# Patient Record
Sex: FEMALE | Race: WHITE
[De-identification: names, ages, dates, MRNs, and addresses within clinical notes are randomized per-mention and may not be internally consistent; named-entity substitution may affect disease eponyms.]

---

## 2017-05-31 ENCOUNTER — HOSPITAL ENCOUNTER (EMERGENCY)
Dept: HOSPITAL 80 - FED | Age: 67
Discharge: HOME | End: 2017-05-31
Payer: COMMERCIAL

## 2017-05-31 VITALS — SYSTOLIC BLOOD PRESSURE: 119 MMHG | OXYGEN SATURATION: 96 % | DIASTOLIC BLOOD PRESSURE: 60 MMHG | HEART RATE: 77 BPM

## 2017-05-31 VITALS — TEMPERATURE: 98.1 F | RESPIRATION RATE: 16 BRPM

## 2017-05-31 DIAGNOSIS — R06.02: Primary | ICD-10-CM

## 2017-05-31 LAB
% IMMATURE GRANULYOCYTES: 0.3 % (ref 0–1.1)
ABSOLUTE IMMATURE GRANULOCYTES: 0.02 10^3/UL (ref 0–0.1)
ABSOLUTE NRBC COUNT: 0 10^3/UL (ref 0–0.01)
ADD DIFF?: NO
ADD MORPH?: NO
ADD SCAN?: NO
ANION GAP SERPL CALC-SCNC: 13 MEQ/L (ref 8–16)
ATYPICAL LYMPHOCYTE FLAG: 10 (ref 0–99)
CALCIUM SERPL-MCNC: 9 MG/DL (ref 8.5–10.4)
CHLORIDE SERPL-SCNC: 110 MEQ/L (ref 97–110)
CO2 SERPL-SCNC: 23 MEQ/L (ref 22–31)
CREAT SERPL-MCNC: 0.8 MG/DL (ref 0.6–1)
ERYTHROCYTE [DISTWIDTH] IN BLOOD BY AUTOMATED COUNT: 12.8 % (ref 11.5–15.2)
FRAGMENT RBC FLAG: 0 (ref 0–99)
GFR SERPL CREATININE-BSD FRML MDRD: > 60 ML/MIN/{1.73_M2}
GLUCOSE SERPL-MCNC: 94 MG/DL (ref 70–100)
HCT VFR BLD CALC: 39.3 % (ref 38–47)
HGB BLD-MCNC: 13.2 G/DL (ref 12.6–16.3)
LEFT SHIFT FLG: 0 (ref 0–99)
LIPEMIA HEMOLYSIS FLAG: 80 (ref 0–99)
MCH RBC BLDCO QN: 32.1 PG (ref 27.9–34.1)
MCHC RBC AUTO-ENTMCNC: 33.6 G/DL (ref 32.4–36.7)
MCV RBC AUTO: 95.6 FL (ref 81.5–99.8)
NRBC-AUTO%: 0 % (ref 0–0.2)
PLATELET # BLD: 180 10^3/UL (ref 150–400)
PLATELET CLUMPS FLAG: 0 (ref 0–99)
PMV BLD AUTO: 11.4 FL (ref 8.7–11.7)
POTASSIUM SERPL-SCNC: 4.3 MEQ/L (ref 3.5–5.2)
RBC # BLD AUTO: 4.11 10^6/UL (ref 4.18–5.33)
SODIUM SERPL-SCNC: 146 MEQ/L (ref 134–144)
TROPONIN I SERPL-MCNC: < 0.012 NG/ML (ref 0–0.03)

## 2017-05-31 NOTE — CPEKG
Heart Rate: 70

RR Interval: 857

P-R Interval: 164

QRSD Interval: 92

QT Interval: 440

QTC Interval: 475

P Axis: 59

QRS Axis: 64

T Wave Axis: 59

EKG Severity - NORMAL ECG -

EKG Impression: SINUS RHYTHM

Electronically Signed By: José Miguel Nicole 31-May-2017 20:16:29

## 2017-05-31 NOTE — EDPHY
H & P


Smoking Status: Never smoked


Time Seen by Provider: 05/31/17 15:59


HPI/ROS: 





CHIEF COMPLAINT:  Near syncope, shortness of breath





HISTORY OF PRESENT ILLNESS:  58-year-old female presents to the emergency 

department by private vehicle complaining of near syncope intermittently over 

last few weeks.  The patient states that very difficult to describe.  She does 

not have pain in her chest.  She does feel like she is having shortness of 

breath however she was feeling like she could get a full breath of air.  She 

has no pain associated with breathing.  No recent travel.  No calf pain or 

swelling.  No abdominal pain.  She did have a headache last evening that lasted 

most of the night however this is now resolved.  She has no headache now.  She 

does not feel dizzy.  Denies neck or back pain.  Denies abdominal pain. No 

nausea or vomiting. The patient states that she did have 1 of these episodes 

where she was not feeling "quite right" while she was in the grocery store 

today.  She did have 1 episode where woke up out of her sleep about 3 or 4 

weeks ago.





REVIEW OF SYSTEMS:


Constitutional:  No fever, no chills.


Eyes:  No double or blurry vision.


ENT:  No sore throat.


Respiratory:  Short of breath. No cough.


Cardiac:  No chest pain.


Gastrointestinal:  No abdominal pain, vomiting or diarrhea.


Genitourinary:  No dysuria.


Musculoskeletal:  No neck or back pain.


Skin:  No rashes.


Neurological:  No headache. (Cynthia Azul)


Past Medical/Surgical History: 





Hypothyroidism, cholecystectomy (Latha,Cynthia M)


Social History: 





Single lives alone in [place] (Ada Azula M)


Physical Exam: 





General Appearance:  Alert, no distress. Vital signs are stable. She is tearful.


Eyes:  Pupils equal and round.  Extraocular motions are all intact.


ENT:  Mouth:  Mucous membranes moist.


Respiratory:  No wheezing, rhonchi, or rales, lungs are clear to auscultation.


Cardiovascular:  Regular rate and rhythm.


Gastrointestinal:  Abdomen is soft and nontender, no masses, no rebound or 

guarding, bowel sounds normal.


Neurological:  Alert and oriented x 3, cranial nerves II through XII grossly 

intact


Skin:  Warm and dry, no rashes.


Musculoskeletal:  Nontender to palpate along the cervical, thoracic or lumbar 

spine.  Neck is supple.


Extremities:  Full range of motion and no peripheral edema.


Psychiatric:  Patient is oriented X 3, there is no agitation. (Cynthia Azul)


Constitutional: 


 Initial Vital Signs











Temperature (C)  36.7 C   05/31/17 15:38


 


Heart Rate  69   05/31/17 15:38


 


Respiratory Rate  16   05/31/17 15:38


 


Blood Pressure  121/62 H  05/31/17 15:38


 


O2 Sat (%)  97   05/31/17 15:38











Allergies/Adverse Reactions: 


 





Sulfa (Sulfonamide Antibiotics) Allergy (Severe, Verified 08/26/16 16:55)


 Generalized swelling








Home Medications: 














 Medication  Instructions  Recorded


 


Ciprofloxacin [Cipro] 500 mg PO BID #14 tab 08/26/16


 


metroNIDAZOLE [Flagyl 500 mg (*)] 500 mg PO QID #28 tab 08/26/16














Medical Decision Making





- Diagnostics


EKG Interpretation: 





EKG:  Complete interpretation has been separately recorded in the [employer] 

archive.  Summary impression:  Sinus rhythm, rate 70 (Cristian Muir)


Imaging Results: 


 Imaging Impressions





Chest X-Ray  05/31/17 16:16


Impression: 


1. No acute process.


2. Minimal bronchitis/airways disease.











ED Course/Re-evaluation: 





66-year-old female presents to the emergency department feeling short of breath 

and having possibly near syncopal episode.  Patient states intermittently over 

last few weeks she has felt difficulty breathing.  She does not know if this is 

related to anxiety. She called her primary care provider today and told to come 

to the emergency department for evaluation.  Patient states that she currently 

does not feel short of breath.  She has no chest pain.  She is not tachycardic.





Laboratory studies including D-dimer and troponin were negative.  Chest x-ray 

was unremarkable.  EKG reveals normal sinus rhythm.





I do not think this patient has a pulmonary embolism.  She is not tachycardic.  

She is not short of breath currently.  She describes no pleuritic chest pain.





The case was discussed with Dr. Frankie Muir, secondary supervising physician, 

who did not directly evaluate the patient but agrees with treatment and plan.





Patient was encouraged to follow up with Cardiology to arrange for a Holter 

monitor and possible treadmill test.  She was given follow-up information.  The 

patient verbalized understanding and agreed.  She was comfortable being 

discharged home. (Cynthia Azul)


Differential Diagnosis: 





Shortness of breath including but not limited to anxiety, pulmonary infectious 

process, COPD, asthma, pulmonary embolus and congestive heart failure. (Cynthia Azul)





- Data Points


Laboratory Results: 


 Laboratory Results





 05/31/17 16:06 





 05/31/17 16:06 





 











  05/31/17 05/31/17 05/31/17





  16:06 16:06 16:06


 


WBC      7.52 10^3/uL 10^3/uL





     (3.80-9.50) 


 


RBC      4.11 10^6/uL L 10^6/uL





     (4.18-5.33) 


 


Hgb      13.2 g/dL g/dL





     (12.6-16.3) 


 


Hct      39.3 % %





     (38.0-47.0) 


 


MCV      95.6 fL fL





     (81.5-99.8) 


 


MCH      32.1 pg pg





     (27.9-34.1) 


 


MCHC      33.6 g/dL g/dL





     (32.4-36.7) 


 


RDW      12.8 % %





     (11.5-15.2) 


 


Plt Count      180 10^3/uL 10^3/uL





     (150-400) 


 


MPV      11.4 fL fL





     (8.7-11.7) 


 


Neut % (Auto)      56.4 % %





     (39.3-74.2) 


 


Lymph % (Auto)      33.1 % %





     (15.0-45.0) 


 


Mono % (Auto)      8.1 % %





     (4.5-13.0) 


 


Eos % (Auto)      1.2 % %





     (0.6-7.6) 


 


Baso % (Auto)      0.9 % %





     (0.3-1.7) 


 


Nucleat RBC Rel Count      0.0 % %





     (0.0-0.2) 


 


Absolute Neuts (auto)      4.24 10^3/uL 10^3/uL





     (1.70-6.50) 


 


Absolute Lymphs (auto)      2.49 10^3/uL 10^3/uL





     (1.00-3.00) 


 


Absolute Monos (auto)      0.61 10^3/uL 10^3/uL





     (0.30-0.80) 


 


Absolute Eos (auto)      0.09 10^3/uL 10^3/uL





     (0.03-0.40) 


 


Absolute Basos (auto)      0.07 10^3/uL 10^3/uL





     (0.02-0.10) 


 


Absolute Nucleated RBC      0.00 10^3/uL 10^3/uL





     (0-0.01) 


 


Immature Gran %      0.3 % %





     (0.0-1.1) 


 


Immature Gran #      0.02 10^3/uL 10^3/uL





     (0.00-0.10) 


 


D-Dimer    < 0.27 ug/mLFEU ug/mLFEU  





    (0.00-0.50)  


 


Sodium  146 mEq/L H mEq/L    





   (134-144)   


 


Potassium  4.3 mEq/L mEq/L    





   (3.5-5.2)   


 


Chloride  110 mEq/L mEq/L    





   ()   


 


Carbon Dioxide  23 mEq/l mEq/l    





   (22-31)   


 


Anion Gap  13 mEq/L mEq/L    





   (8-16)   


 


BUN  20 mg/dL mg/dL    





   (7-23)   


 


Creatinine  0.8 mg/dL mg/dL    





   (0.6-1.0)   


 


Estimated GFR  > 60     





    


 


Glucose  94 mg/dL mg/dL    





   ()   


 


Calcium  9.0 mg/dL mg/dL    





   (8.5-10.4)   


 


Troponin I  < 0.012 ng/mL ng/mL    





   (0-0.034)   














Departure





- Departure


Disposition: Home, Routine, Self-Care


Clinical Impression: 


 Shortness of breath





Condition: Good


Instructions:  Dyspnea (ED)


Additional Instructions: 


Follow-up with cardiologist as discussed to arrange for a Holter monitor and 

follow-up appointment.  Please return to the emergency department if you 

developed pain in your chest, difficulty breathing, or if you feel worse in any 

way.


Referrals: 


Shelbie Clinton MD [Primary Care Provider] - 1-2 days without fail


Jose Juan Milan MD [Medical Doctor] - 1-2 days without fail (Cardiologist 

on-call)

## 2017-07-13 ENCOUNTER — HOSPITAL ENCOUNTER (OUTPATIENT)
Dept: HOSPITAL 80 - FIMAGING | Age: 67
End: 2017-07-13
Attending: FAMILY MEDICINE
Payer: COMMERCIAL

## 2017-07-13 DIAGNOSIS — Z12.31: Primary | ICD-10-CM

## 2017-07-13 PROCEDURE — G0202 SCR MAMMO BI INCL CAD: HCPCS

## 2018-04-29 ENCOUNTER — HOSPITAL ENCOUNTER (EMERGENCY)
Dept: HOSPITAL 80 - FED | Age: 68
Discharge: HOME | End: 2018-04-29
Payer: COMMERCIAL

## 2018-04-29 VITALS — DIASTOLIC BLOOD PRESSURE: 63 MMHG | SYSTOLIC BLOOD PRESSURE: 132 MMHG

## 2018-04-29 DIAGNOSIS — S86.911A: Primary | ICD-10-CM
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
## 2018-04-29 DIAGNOSIS — Z88.2: ICD-10-CM

## 2018-04-29 DIAGNOSIS — S00.81XA: ICD-10-CM

## 2018-04-29 DIAGNOSIS — S00.502A: ICD-10-CM

## 2018-04-29 NOTE — EDPHY
H & P


Time Seen by Provider: 04/29/18 10:14


HPI/ROS: 





CHIEF COMPLAINT:  Mechanical fall, right leg pain, facial abrasions, knee 

abrasions, hand abrasions





HISTORY OF PRESENT ILLNESS:  The patient presents to the ED after she sustained 

a mechanical fall.  She fell onto her face, knees and hands.  She presents to 

the ED with complaints of some mild dental pain, right leg pain and superficial 

abrasions.  The patient denies any headache or loss of consciousness.  She 

denies headache, neck pain, chest pain, difficulty breathing, numbness, 

weakness or other complaints.





REVIEW OF SYSTEMS:


A comprehensive 10 point review of systems is otherwise negative aside from 

elements mentioned in the history of present illness.


Source: Patient


Exam Limitations: No limitations





- Medical/Surgical History


Hx Asthma: No


Hx Chronic Respiratory Disease: No


Hx Diabetes: No


Hx Cardiac Disease: No


Hx Renal Disease: No


Hx Cirrhosis: No


Hx Alcoholism: No


Hx HIV/AIDS: No


Hx Splenectomy or Spleen Trauma: No


Other PMH: gall bladder.  hypothyroid.  shingles





- Social History


Smoking Status: Never smoked





- Physical Exam


Exam: 





General Appearance:  Alert, no distress


Head:  Superficial abrasions noted to the nose and upper lip.


Eyes:  Pupils equal, round, reactive


ENT, Mouth:  Patient complaints of pain in her upper central incisors without 

obvious fracture or intraoral laceration


Neck:  Nontender, trachea midline


Respiratory:  No chest wall tender,no subcutaneous air, lungs clear bilaterally


Cardiovascular:  Regular rate and rhythm


Abdomen:  Abdomen is soft and nontender, pelvis stable


Skin:  Superficial abrasions bilateral hands and knees


Back:  No midline T/L/S pain


Extremities:  Tenderness to palpation along the lateral aspect of the right leg


Neurological:  A&Ox3, normal motor function, normal sensory exam, GCS 15


Constitutional: 


 Initial Vital Signs











Temperature (C)  37 C   04/29/18 10:08


 


Heart Rate  76   04/29/18 10:08


 


Respiratory Rate  16   04/29/18 10:08


 


Blood Pressure  150/75 H  04/29/18 10:08


 


O2 Sat (%)  96   04/29/18 10:08








 











O2 Delivery Mode               Room Air














Allergies/Adverse Reactions: 


 





Sulfa (Sulfonamide Antibiotics) Allergy (Severe, Verified 08/26/16 16:55)


 Generalized swelling








Home Medications: 














 Medication  Instructions  Recorded


 


Natural Thyroid  04/29/18














Medical Decision Making





- Diagnostics


Imaging Results: 


 Imaging Impressions





Tibia/Fibula X-Ray  04/29/18 10:25


Impression: Normal right tibia and fibula series.











ED Course/Re-evaluation: 





The patient presents to the ED after mechanical fall.  She is noted to have 

several superficial abrasions.  The patient complaints of dental pain and has 

no obvious clinical fracture on exam.  Patient also complained of right lower 

leg pain.





X-rays of the lower extremity demonstrate no evidence of an acute fracture.  

The patient's abrasions have been cleaned and treated with bacitracin.





The patient will be advised to take Tylenol and ibuprofen as needed for pain.  

She should follow up with her regular dentist for evaluation of her dental 

pain.  She is advised to follow up with our on-call orthopedic surgeon for any 

persistent leg pain or other concerns.





The patient has no clinical evidence of a closed head injury or cervical spine 

fracture.  She did have serial examinations in the ED and remains with a soft 

nontender abdomen and no additional traumatic injury noted.








Differential Diagnosis: 





Differential diagnosis considered includes fracture, sprain, dislocation





Departure





- Departure


Disposition: Home, Routine, Self-Care


Clinical Impression: 


 Dental injury





Facial abrasion


Qualifiers:


 Encounter type: initial encounter Qualified Code(s): S00.81XA - Abrasion of 

other part of head, initial encounter





Muscle strain of lower leg


Qualifiers:


 Encounter type: initial encounter Laterality: right Qualified Code(s): 

S86.911A - Strain of unspecified muscle(s) and tendon(s) at lower leg level, 

right leg, initial encounter





Condition: Good


Instructions:  Abrasion (ED)


Additional Instructions: 


1. Tylenol and ibuprofen as needed for pain.


2. Apply antibiotic ointment twice a day for next week.


3. Return to the ED for markedly worsening pain or other concerns.


4. Your x-ray demonstrates no evidence of an obvious fracture.  I believe your 

experiencing a strain involving the right leg.  Please follow up with the 

orthopedic surgeon you have been referred to for any unimproved symptoms past 5-

7 days.








Referrals: 


Vishal Velazco MD [Medical Doctor] - As per Instructions

## 2018-06-25 ENCOUNTER — HOSPITAL ENCOUNTER (EMERGENCY)
Dept: HOSPITAL 80 - FED | Age: 68
Discharge: HOME | End: 2018-06-25
Payer: COMMERCIAL

## 2018-06-25 VITALS — DIASTOLIC BLOOD PRESSURE: 62 MMHG | SYSTOLIC BLOOD PRESSURE: 119 MMHG

## 2018-06-25 DIAGNOSIS — I49.3: Primary | ICD-10-CM

## 2018-06-25 LAB — PLATELET # BLD: 204 10^3/UL (ref 150–400)

## 2018-06-25 NOTE — EDPHY
H & P


Stated Complaint: PALPITATIONS/DIZZY/SOB


Time Seen by Provider: 06/25/18 12:06


HPI/ROS: 





CHIEF COMPLAINT:  Palpitations





HISTORY OF PRESENT ILLNESS:  The patient presents the ED with a 2 week history 

of intermittent palpitations.  The patient describes a sensation of a episodic 

brief skipped heartbeat which precipitates symptoms of lightheadedness and 

brief breathlessness.  The patient denies any chest pain per se.  She has no 

history of exertional chest pain or shortness of breath.  The patient has no 

history of coronary artery disease.  She does have a history of hypothyroidism 

which is currently managed with medications.  The patient has been having some 

symptoms of intermittent diarrhea which has not acutely changed.  





REVIEW OF SYSTEMS:


A comprehensive 10 point review of systems is otherwise negative aside from 

elements mentioned in the history of present illness.








Source: Patient





- Personal History


Current Tetanus/Diphtheria Vaccine: Yes


Current Tetanus Diphtheria and Acellular Pertussis (TDAP): Unsure





- Medical/Surgical History


Hx Asthma: No


Hx Chronic Respiratory Disease: No


Hx Diabetes: No


Hx Cardiac Disease: No


Hx Renal Disease: No


Hx Cirrhosis: No


Hx Alcoholism: No


Hx HIV/AIDS: No


Hx Splenectomy or Spleen Trauma: No


Other PMH: gall bladder.  hypothyroid.  shingles





- Social History


Smoking Status: Never smoked





- Physical Exam


Exam: 





General Appearance:  Alert, tearful, anxious


Eyes:  Pupils equal and round no pallor or injection


ENT, Mouth:  Mucous membranes moist


Respiratory:  There are no retractions, lungs are clear to auscultation


Cardiovascular:  Regular rate and rhythm


Gastrointestinal:  Abdomen is soft and nontender, no masses, bowel sounds normal


Neurological:  5/5 strength all 4 extremities


Skin:  Warm and dry, no rashes


Musculoskeletal:  Neck is supple nontender


Extremities:  symmetrical, full range of motion





Constitutional: 


 Initial Vital Signs











Temperature (C)  36.7 C   06/25/18 11:59


 


Heart Rate  69   06/25/18 11:59


 


Respiratory Rate  18   06/25/18 11:59


 


Blood Pressure  109/84 H  06/25/18 11:59


 


O2 Sat (%)  97   06/25/18 11:59








 











O2 Delivery Mode               Room Air














Allergies/Adverse Reactions: 


 





Sulfa (Sulfonamide Antibiotics) Allergy (Severe, Verified 06/25/18 11:58)


 Generalized swelling








Home Medications: 














 Medication  Instructions  Recorded


 


Natural Thyroid  04/29/18














Medical Decision Making





- Diagnostics


EKG Interpretation: 





EKG:  Complete interpretation has been separately recorded in the [employer] 

archive.  Summary impression:  Sinus rhythm, unifocal PVC, no ST segment 

elevation or depression.


ED Course/Re-evaluation: 





The patient was placed on a cardiac monitor.  I spent some time with the 

patient in her room when it was clear she was experiencing symptomatic PVCs.  

She had a direct correlation between PVCs on the monitor and her sensation of 

lightheadedness and breathlessness.  The patient had no significant runs of 

ventricular tachycardia or bigeminy/trigeminy.





Labs testing in the ED is unremarkable.





The patient will be referred to our cardiologist and also follow-up with her 

PCP.





She is discharged to home with customary discharge instructions.








Differential Diagnosis: 





Differential diagnosis considered includes includes SVT, atrial fibrillation, 

hyperthyroidism, anemia, metabolic abnormality











- Data Points


Laboratory Results: 


 Laboratory Results





 06/25/18 12:15 





 06/25/18 12:15 





 











  06/25/18 06/25/18 06/25/18





  12:30 12:15 12:15


 


WBC      9.52 10^3/uL H 10^3/uL





     (3.80-9.50) 


 


RBC      4.33 10^6/uL 10^6/uL





     (4.18-5.33) 


 


Hgb      13.6 g/dL g/dL





     (12.6-16.3) 


 


Hct      41.4 % %





     (38.0-47.0) 


 


MCV      95.6 fL fL





     (81.5-99.8) 


 


MCH      31.4 pg pg





     (27.9-34.1) 


 


MCHC      32.9 g/dL g/dL





     (32.4-36.7) 


 


RDW      12.7 % %





     (11.5-15.2) 


 


Plt Count      204 10^3/uL 10^3/uL





     (150-400) 


 


MPV      11.4 fL fL





     (8.7-11.7) 


 


Neut % (Auto)      62.6 % %





     (39.3-74.2) 


 


Lymph % (Auto)      26.2 % %





     (15.0-45.0) 


 


Mono % (Auto)      9.2 % %





     (4.5-13.0) 


 


Eos % (Auto)      1.1 % %





     (0.6-7.6) 


 


Baso % (Auto)      0.6 % %





     (0.3-1.7) 


 


Nucleat RBC Rel Count      0.0 % %





     (0.0-0.2) 


 


Absolute Neuts (auto)      5.96 10^3/uL 10^3/uL





     (1.70-6.50) 


 


Absolute Lymphs (auto)      2.49 10^3/uL 10^3/uL





     (1.00-3.00) 


 


Absolute Monos (auto)      0.88 10^3/uL H 10^3/uL





     (0.30-0.80) 


 


Absolute Eos (auto)      0.10 10^3/uL 10^3/uL





     (0.03-0.40) 


 


Absolute Basos (auto)      0.06 10^3/uL 10^3/uL





     (0.02-0.10) 


 


Absolute Nucleated RBC      0.00 10^3/uL 10^3/uL





     (0-0.01) 


 


Immature Gran %      0.3 % %





     (0.0-1.1) 


 


Immature Gran #      0.03 10^3/uL 10^3/uL





     (0.00-0.10) 


 


Sodium    146 mEq/L H mEq/L  





    (135-145)  


 


Potassium    3.9 mEq/L mEq/L  





    (3.3-5.0)  


 


Chloride    105 mEq/L mEq/L  





    ()  


 


Carbon Dioxide    27 mEq/l mEq/l  





    (22-31)  


 


Anion Gap    14 mEq/L mEq/L  





    (8-16)  


 


BUN    22 mg/dL mg/dL  





    (7-23)  


 


Creatinine    0.8 mg/dL mg/dL  





    (0.6-1.0)  


 


Estimated GFR    > 60   





    


 


Glucose    60 mg/dL L mg/dL  





    ()  


 


Calcium    9.4 mg/dL mg/dL  





    (8.5-10.4)  


 


POC Troponin I  0.00 ng/mL ng/mL    





   (0.00-0.08)   


 


TSH    Pending   





    


 


Free T3    3.42 pg/mL pg/mL  





    (2.77-5.27)  











Point of Care Test Results: 


 Chemistry











  06/25/18





  12:30


 


POC Troponin I  0.00 ng/mL ng/mL





   (0.00-0.08) 














Departure





- Departure


Disposition: Home, Routine, Self-Care


Clinical Impression: 


 PVCs (premature ventricular contractions)





Condition: Good


Instructions:  Premature Ventricular Contractions (ED)


Additional Instructions: 


1. Your workup in the emergency department does demonstrate premature 

ventricular contractions.  This is a benign arrhythmia.  The remainder of your 

workup is normal.


2. I do recommend following up with Cardiology to discuss possible treatment 

options.  You have been given the contact information for Dr. Watts.


3.  Return to the ED for markedly worsening symptoms or other concerns.








Referrals: 


Shelbie Clinton MD [Primary Care Provider] - As per Instructions

## 2018-06-25 NOTE — CPEKG
Heart Rate: 60

RR Interval: 1000

P-R Interval: 156

QRSD Interval: 86

QT Interval: 428

QTC Interval: 428

P Axis: 61

QRS Axis: 66

T Wave Axis: 59

EKG Severity - OTHERWISE NORMAL ECG -

EKG Impression: SINUS RHYTHM

EKG Impression: VENTRICULAR PREMATURE COMPLEX

Electronically Signed By: Cristian Muir 25-Jun-2018 13:30:06

## 2018-07-05 ENCOUNTER — HOSPITAL ENCOUNTER (OUTPATIENT)
Age: 68
End: 2018-07-05
Payer: COMMERCIAL

## 2018-07-05 DIAGNOSIS — R00.2: ICD-10-CM

## 2018-07-05 DIAGNOSIS — E87.6: ICD-10-CM

## 2018-07-05 DIAGNOSIS — I49.3: ICD-10-CM

## 2018-07-05 DIAGNOSIS — R06.02: Primary | ICD-10-CM

## 2018-07-10 ENCOUNTER — HOSPITAL ENCOUNTER (OUTPATIENT)
Dept: HOSPITAL 80 - BHFA | Age: 68
End: 2018-07-10
Attending: INTERNAL MEDICINE
Payer: COMMERCIAL

## 2018-07-10 DIAGNOSIS — I49.3: Primary | ICD-10-CM

## 2018-07-11 ENCOUNTER — HOSPITAL ENCOUNTER (OUTPATIENT)
Dept: HOSPITAL 80 - BHFA | Age: 68
End: 2018-07-11
Attending: INTERNAL MEDICINE
Payer: COMMERCIAL

## 2018-07-11 DIAGNOSIS — R00.2: Primary | ICD-10-CM

## 2018-07-26 ENCOUNTER — HOSPITAL ENCOUNTER (OUTPATIENT)
Dept: HOSPITAL 80 - BHFA | Age: 68
End: 2018-07-26
Attending: INTERNAL MEDICINE
Payer: COMMERCIAL

## 2018-07-26 DIAGNOSIS — R00.2: Primary | ICD-10-CM

## 2018-07-26 DIAGNOSIS — R06.02: ICD-10-CM

## 2018-11-15 ENCOUNTER — HOSPITAL ENCOUNTER (OUTPATIENT)
Dept: HOSPITAL 80 - FIMAGING | Age: 68
End: 2018-11-15
Attending: FAMILY MEDICINE
Payer: COMMERCIAL

## 2018-11-15 DIAGNOSIS — Z12.31: ICD-10-CM

## 2018-11-15 DIAGNOSIS — M89.9: Primary | ICD-10-CM

## 2018-11-15 DIAGNOSIS — Z80.3: ICD-10-CM

## 2018-11-15 DIAGNOSIS — N83.9: ICD-10-CM

## 2018-11-15 DIAGNOSIS — Z78.0: ICD-10-CM

## 2018-11-15 DIAGNOSIS — M81.0: ICD-10-CM
